# Patient Record
Sex: MALE | Race: ASIAN | NOT HISPANIC OR LATINO | Employment: FULL TIME | URBAN - METROPOLITAN AREA
[De-identification: names, ages, dates, MRNs, and addresses within clinical notes are randomized per-mention and may not be internally consistent; named-entity substitution may affect disease eponyms.]

---

## 2017-03-21 ENCOUNTER — ALLSCRIPTS OFFICE VISIT (OUTPATIENT)
Dept: OTHER | Facility: OTHER | Age: 52
End: 2017-03-21

## 2017-07-06 ENCOUNTER — ALLSCRIPTS OFFICE VISIT (OUTPATIENT)
Dept: OTHER | Facility: OTHER | Age: 52
End: 2017-07-06

## 2018-01-12 VITALS
HEART RATE: 64 BPM | TEMPERATURE: 98.2 F | DIASTOLIC BLOOD PRESSURE: 72 MMHG | OXYGEN SATURATION: 99 % | BODY MASS INDEX: 29.72 KG/M2 | SYSTOLIC BLOOD PRESSURE: 140 MMHG | WEIGHT: 196.13 LBS | HEIGHT: 68 IN | RESPIRATION RATE: 18 BRPM

## 2018-01-14 VITALS
RESPIRATION RATE: 18 BRPM | HEIGHT: 68 IN | OXYGEN SATURATION: 99 % | HEART RATE: 57 BPM | BODY MASS INDEX: 28.54 KG/M2 | WEIGHT: 188.31 LBS | SYSTOLIC BLOOD PRESSURE: 118 MMHG | DIASTOLIC BLOOD PRESSURE: 72 MMHG | TEMPERATURE: 97.1 F

## 2019-01-31 LAB
LEFT EYE DIABETIC RETINOPATHY: NORMAL
RIGHT EYE DIABETIC RETINOPATHY: NORMAL

## 2019-02-06 ENCOUNTER — OFFICE VISIT (OUTPATIENT)
Dept: FAMILY MEDICINE CLINIC | Facility: CLINIC | Age: 54
End: 2019-02-06
Payer: COMMERCIAL

## 2019-02-06 VITALS
DIASTOLIC BLOOD PRESSURE: 80 MMHG | BODY MASS INDEX: 30.87 KG/M2 | RESPIRATION RATE: 16 BRPM | SYSTOLIC BLOOD PRESSURE: 130 MMHG | WEIGHT: 203 LBS | HEART RATE: 92 BPM | OXYGEN SATURATION: 100 %

## 2019-02-06 DIAGNOSIS — Z00.00 ROUTINE ADULT HEALTH MAINTENANCE: Primary | ICD-10-CM

## 2019-02-06 DIAGNOSIS — I15.9 SECONDARY HYPERTENSION: ICD-10-CM

## 2019-02-06 DIAGNOSIS — E11.8 TYPE 2 DIABETES MELLITUS WITH COMPLICATION, WITHOUT LONG-TERM CURRENT USE OF INSULIN (HCC): ICD-10-CM

## 2019-02-06 PROCEDURE — 99396 PREV VISIT EST AGE 40-64: CPT | Performed by: FAMILY MEDICINE

## 2019-02-06 RX ORDER — LISINOPRIL 2.5 MG/1
2.5 TABLET ORAL DAILY
Qty: 90 TABLET | Refills: 3 | Status: SHIPPED | OUTPATIENT
Start: 2019-02-06

## 2019-02-06 NOTE — LETTER
February 6, 2019     Patient: Chinmay Sterling   YOB: 1965   Date of Visit: 2/6/2019       To Whom it May Concern:    Chinmay Sterling is under my professional care  He was seen in my office on 2/6/2019  He is in good health with stable condition  If you have any questions or concerns, please don't hesitate to call           Sincerely,          Americo Burnham MD

## 2019-02-07 NOTE — PROGRESS NOTES
Assessment/Plan:    No problem-specific Assessment & Plan notes found for this encounter  Diagnoses and all orders for this visit:    Type 2 diabetes mellitus with complication, without long-term current use of insulin (HCC)  -     Hemoglobin A1C; Future  -     metFORMIN (GLUCOPHAGE) 500 mg tablet; Take 1 tablet (500 mg total) by mouth daily with breakfast  -     Hemoglobin A1C; Future    Secondary hypertension  -     Lipid panel; Future  -     Comprehensive metabolic panel; Future  -     Microalbumin / creatinine urine ratio; Future  -     lisinopril (ZESTRIL) 2 5 mg tablet; Take 1 tablet (2 5 mg total) by mouth daily    Other orders  -     Discontinue: metFORMIN (GLUCOPHAGE) 500 mg tablet; Take by mouth          Prostate Cancer Screening:  Risks and Benefits discussed    Testicular Cancer Screening:  Risks and Benefits discussed    Colorectal Cancer Screening:  Risks and Benefits discussed  cologard done 2017, no result available, attempted to obtain cologuard result, noted to be positive, spoke with pt, advise pt to see GI ASAP, given a copy of report      Preventing Counseling:  Advice and education were given regarding nutrition, aerobic exercises, weight bearing exercises, cardiovascular risk reduction, fall risk reduction, and age appropriate supplements  The patient was counseled regarding instructions for management, risk factor reductions, prognosis, risks and benefits of treatment options, patient and family education, and importance of compliance with treatment  No Follow-up on file  Subjective:      Patient ID: Anabelle Tomas is a 47 y o  male      Visit Type: Health Maintenance    General Health:     Dental Regular visits: No    Vision Problems: No, recently seen pathobiologist for diabetic eye check, report reviewed    Hearing loss: No    Life Style  Healthy Diet:  Can be improve, try to be healthy  Regular Exercise: No  Weight Concerns: Yes  Tobacco Use: No  Alcohol Use: Social  Drug Use: No      Reproductive Health  Sexually Active: Yes  Contraception: No        Chief Complaint   Patient presents with    Well Check     CPE       59-year-old male comes in for complete physical, 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, patient has history diabetes mellitus type 2 for several years has been on metformin 500 mg p o  Daily, his hemoglobin A1c has been stable around 6, pt reporting he noted his sugar to be running around 120s, so he stopped metformin, recently seen the eye doctor reporting no complication with his retina, however possible glaucoma, had coloGuard done with his GI, reporting that he does not know the results since his GI never call him deny seen blood in the stool reporting his health is good, he is feeling great  The following portions of the patient's history were reviewed and updated as appropriate: allergies, current medications, past family history, past medical history, past social history, past surgical history and problem list       Review of Systems   Constitutional: Negative for activity change, appetite change, chills, diaphoresis, fatigue, fever and unexpected weight change  HENT: Negative for congestion, dental problem, drooling, ear discharge, ear pain, facial swelling, hearing loss, mouth sores, nosebleeds, postnasal drip, rhinorrhea, sinus pain and sinus pressure  Eyes: Negative for photophobia, pain, discharge, redness, itching and visual disturbance  Respiratory: Negative for apnea, cough, choking, chest tightness, shortness of breath, wheezing and stridor  Cardiovascular: Negative for chest pain, palpitations and leg swelling  Gastrointestinal: Negative for abdominal distention, abdominal pain, anal bleeding, blood in stool, constipation, diarrhea, nausea, rectal pain and vomiting  Endocrine: Negative for cold intolerance, heat intolerance, polydipsia, polyphagia and polyuria     Genitourinary: Negative for decreased urine volume, difficulty urinating, dyspareunia, dysuria, flank pain, hematuria and pelvic pain  Musculoskeletal: Negative for arthralgias, back pain, gait problem, joint swelling, myalgias, neck pain and neck stiffness  Skin: Negative for color change, pallor, rash and wound  Allergic/Immunologic: Negative for environmental allergies, food allergies and immunocompromised state  Neurological: Negative for dizziness, tremors, seizures, syncope, facial asymmetry, speech difficulty, weakness, light-headedness, numbness and headaches  Hematological: Negative for adenopathy  Does not bruise/bleed easily  Psychiatric/Behavioral: Negative for agitation, behavioral problems, confusion, decreased concentration, dysphoric mood, hallucinations, sleep disturbance and suicidal ideas  The patient is not nervous/anxious  Objective:    History   Smoking Status    Not on file   Smokeless Tobacco    Not on file       Allergies: No Known Allergies    Vitals:  /80   Pulse 92   Resp 16   Wt 92 1 kg (203 lb)   SpO2 100%   BMI 30 87 kg/m²     Current Outpatient Prescriptions   Medication Sig Dispense Refill    metFORMIN (GLUCOPHAGE) 500 mg tablet Take 1 tablet (500 mg total) by mouth daily with breakfast 90 tablet 3    lisinopril (ZESTRIL) 2 5 mg tablet Take 1 tablet (2 5 mg total) by mouth daily 90 tablet 3     No current facility-administered medications for this visit  Physical Exam   Constitutional:  oriented to person, place, and time  well-developed and well-nourished  No distress  HENT:  Normocephalic and atraumatic, Oropharynx is clear and moist  No oropharyngeal exudate  Conjunctivae and EOM are normal  Pupils are equal, round, and reactive to light  No scleral icterus  Neck: Normal range of motion  Neck supple  No JVD present  No thyromegaly present  Cardiovascular: Normal rate, regular rhythm, normal heart sounds and intact distal pulses  Exam reveals no gallop and no friction rub      No murmur heard  Pulmonary/Chest: Effort normal and breath sounds normal  No respiratory distress  no wheezes  no rales  no tenderness  Abdominal: Soft  Bowel sounds are normal  no distension and no mass  There is no tenderness  There is no rebound and no guarding  Musculoskeletal: Normal range of motion, no edema, tenderness or deformity  Lymphadenopathy:   no cervical adenopathy  Neurological: alert and oriented to person, place, and time  normal reflexes  No cranial nerve deficit  normal muscle tone  Coordination normal    Skin: Skin is warm and dry  No rash noted  not diaphoretic  No erythema  Psychiatric:normal mood and affect  behavior is normal  Judgment and thought content normal    Nursing note and vitals reviewed  Results Reviewed     None        PHQ9 REVIEWED  Immunization status: up to date and documented, unknown status, parent to bring shot records

## 2019-02-12 LAB
ALBUMIN SERPL-MCNC: 4.6 G/DL (ref 3.5–5.5)
ALBUMIN/CREAT UR: <2.5 MG/G CREAT (ref 0–30)
ALBUMIN/GLOB SERPL: 1.8 {RATIO} (ref 1.2–2.2)
ALP SERPL-CCNC: 48 IU/L (ref 39–117)
ALT SERPL-CCNC: 23 IU/L (ref 0–44)
AST SERPL-CCNC: 23 IU/L (ref 0–40)
BILIRUB SERPL-MCNC: 0.4 MG/DL (ref 0–1.2)
BUN SERPL-MCNC: 18 MG/DL (ref 6–24)
BUN/CREAT SERPL: 18 (ref 9–20)
CALCIUM SERPL-MCNC: 9.3 MG/DL (ref 8.7–10.2)
CHLORIDE SERPL-SCNC: 100 MMOL/L (ref 96–106)
CHOLEST SERPL-MCNC: 192 MG/DL (ref 100–199)
CO2 SERPL-SCNC: 23 MMOL/L (ref 20–29)
CREAT SERPL-MCNC: 1.02 MG/DL (ref 0.76–1.27)
CREAT UR-MCNC: 121.4 MG/DL
GLOBULIN SER-MCNC: 2.5 G/DL (ref 1.5–4.5)
GLUCOSE SERPL-MCNC: 111 MG/DL (ref 65–99)
HBA1C MFR BLD: 6.3 % (ref 4.8–5.6)
HDLC SERPL-MCNC: 36 MG/DL
LABCORP COMMENT: NORMAL
LDLC SERPL CALC-MCNC: 116 MG/DL (ref 0–99)
MICROALBUMIN UR-MCNC: <3 UG/ML
POTASSIUM SERPL-SCNC: 4.5 MMOL/L (ref 3.5–5.2)
PROT SERPL-MCNC: 7.1 G/DL (ref 6–8.5)
SL AMB EGFR AFRICAN AMERICAN: 97 ML/MIN/1.73
SL AMB EGFR NON AFRICAN AMERICAN: 84 ML/MIN/1.73
SL AMB VLDL CHOLESTEROL CALC: 40 MG/DL (ref 5–40)
SODIUM SERPL-SCNC: 136 MMOL/L (ref 134–144)
TRIGL SERPL-MCNC: 201 MG/DL (ref 0–149)

## 2019-02-13 ENCOUNTER — TELEPHONE (OUTPATIENT)
Dept: GASTROENTEROLOGY | Facility: CLINIC | Age: 54
End: 2019-02-13

## 2019-02-13 DIAGNOSIS — Z12.11 SCREEN FOR COLON CANCER: Primary | ICD-10-CM

## 2019-02-13 PROBLEM — R19.5 POSITIVE COLORECTAL CANCER SCREENING USING COLOGUARD TEST: Status: ACTIVE | Noted: 2019-02-13

## 2019-02-13 NOTE — TELEPHONE ENCOUNTER
----- Message from Suraj Cochran sent at 2/13/2019  9:12 AM EST -----  Ryland Fam,     As we discussed, Please schedule patient for colonoscopy  2/22 (Dr Arnold Meneses next [facility] scope day)  Dr Smith Manner I will look into why we did not receive his abnormal stool dna test        ----- Message -----  From: Mauro Marcano MD  Sent: 2/12/2019   1:30 PM  To: Mauro Marcano MD, Suraj Cochran    Patient called office, has abnormal stool dna test      We never received the results  Patient called for bill, but I see no documentation in epic or in allscripts  He needs a colonoscopy ASAP    You can scheduel him for procedure if he wants to go stright to colon we can do that as  well

## 2019-02-13 NOTE — TELEPHONE ENCOUNTER
Pt has been scheduled with Kelly 2/22/2019 at [facility]  Pt has been notified, instructions have been sent  Requested with Simba Gamble for 730 am start for Dr Edgar Santana on 2/22/2019  Please send Suprep to pharmacy on file

## 2019-02-20 NOTE — TELEPHONE ENCOUNTER
I also called and left a voicemail for the patient  I told him that this is a diagnostic test because of the abnormal stool test   He must have this test whether discovered completely by his insurance or not because it could be life-threatening

## 2019-02-21 ENCOUNTER — ANESTHESIA EVENT (OUTPATIENT)
Dept: GASTROENTEROLOGY | Facility: AMBULARY SURGERY CENTER | Age: 54
End: 2019-02-21
Payer: COMMERCIAL

## 2019-02-21 RX ORDER — IBUPROFEN 200 MG
TABLET ORAL EVERY 6 HOURS PRN
COMMUNITY

## 2019-02-21 RX ORDER — ACETAMINOPHEN 500 MG
500 TABLET ORAL EVERY 6 HOURS PRN
COMMUNITY

## 2019-02-21 NOTE — PRE-PROCEDURE INSTRUCTIONS
Pre-Surgery Instructions:   Medication Instructions    acetaminophen (TYLENOL) 500 mg tablet Patient was instructed by Physician and understands   BIOTIN PO Patient was instructed by Physician and understands   Glucosamine-Chondroitin (MOVE FREE PO) Patient was instructed by Physician and understands   ibuprofen (MOTRIN) 200 mg tablet Patient was instructed by Physician and understands   Na Sulfate-K Sulfate-Mg Sulf (SUPREP BOWEL PREP KIT) 17 5-3 13-1 6 GM/177ML SOLN Patient was instructed by Physician and understands   Probiotic Product (PROBIOTIC PO) Patient was instructed by Physician and understands  Pt to follow Dr Rosales Lunch instructions    wife Donny

## 2019-02-21 NOTE — ANESTHESIA PREPROCEDURE EVALUATION
Review of Systems/Medical History  Patient summary reviewed  Chart reviewed  No history of anesthetic complications     Cardiovascular  No hypertension (h/o HTN) ,    Pulmonary  Smoker ex-smoker  ,        GI/Hepatic            Endo/Other  Diabetes (insulin resistance history) type 2 ,   Obesity    GYN       Hematology   Musculoskeletal       Neurology   Psychology           Physical Exam    Airway    Mallampati score: II  TM Distance: >3 FB  Neck ROM: full     Dental       Cardiovascular  Rhythm: regular, Rate: normal,     Pulmonary  Breath sounds clear to auscultation,     Other Findings        Anesthesia Plan  ASA Score- 2     Anesthesia Type- IV sedation with anesthesia with ASA Monitors  Additional Monitors:   Airway Plan:         Plan Factors-    Induction- intravenous  Postoperative Plan-     Informed Consent- Anesthetic plan and risks discussed with patient  I personally reviewed this patient with the CRNA  Discussed and agreed on the Anesthesia Plan with the CRNA  Joni De La Torre

## 2019-02-22 ENCOUNTER — ANESTHESIA (OUTPATIENT)
Dept: GASTROENTEROLOGY | Facility: AMBULARY SURGERY CENTER | Age: 54
End: 2019-02-22
Payer: COMMERCIAL

## 2019-02-22 ENCOUNTER — HOSPITAL ENCOUNTER (OUTPATIENT)
Facility: AMBULARY SURGERY CENTER | Age: 54
Setting detail: OUTPATIENT SURGERY
Discharge: HOME | End: 2019-02-22
Attending: INTERNAL MEDICINE | Admitting: INTERNAL MEDICINE
Payer: COMMERCIAL

## 2019-02-22 VITALS
SYSTOLIC BLOOD PRESSURE: 109 MMHG | BODY MASS INDEX: 29.7 KG/M2 | RESPIRATION RATE: 18 BRPM | WEIGHT: 196 LBS | HEART RATE: 71 BPM | DIASTOLIC BLOOD PRESSURE: 72 MMHG | HEIGHT: 68 IN | OXYGEN SATURATION: 96 % | TEMPERATURE: 97.2 F

## 2019-02-22 DIAGNOSIS — R19.5 POSITIVE COLORECTAL CANCER SCREENING USING COLOGUARD TEST: ICD-10-CM

## 2019-02-22 PROCEDURE — 88305 TISSUE EXAM BY PATHOLOGIST: CPT | Performed by: PATHOLOGY

## 2019-02-22 PROCEDURE — 45385 COLONOSCOPY W/LESION REMOVAL: CPT | Performed by: INTERNAL MEDICINE

## 2019-02-22 RX ORDER — PROPOFOL 10 MG/ML
INJECTION, EMULSION INTRAVENOUS AS NEEDED
Status: DISCONTINUED | OUTPATIENT
Start: 2019-02-22 | End: 2019-02-22 | Stop reason: SURG

## 2019-02-22 RX ORDER — SODIUM CHLORIDE 9 MG/ML
INJECTION, SOLUTION INTRAVENOUS CONTINUOUS PRN
Status: DISCONTINUED | OUTPATIENT
Start: 2019-02-22 | End: 2019-02-22 | Stop reason: SURG

## 2019-02-22 RX ORDER — LIDOCAINE HYDROCHLORIDE 10 MG/ML
INJECTION, SOLUTION INFILTRATION; PERINEURAL AS NEEDED
Status: DISCONTINUED | OUTPATIENT
Start: 2019-02-22 | End: 2019-02-22 | Stop reason: SURG

## 2019-02-22 RX ORDER — SODIUM CHLORIDE 9 MG/ML
75 INJECTION, SOLUTION INTRAVENOUS CONTINUOUS
Status: DISCONTINUED | OUTPATIENT
Start: 2019-02-22 | End: 2019-02-22 | Stop reason: HOSPADM

## 2019-02-22 RX ADMIN — PROPOFOL 100 MG: 10 INJECTION, EMULSION INTRAVENOUS at 08:06

## 2019-02-22 RX ADMIN — PROPOFOL 60 MG: 10 INJECTION, EMULSION INTRAVENOUS at 08:21

## 2019-02-22 RX ADMIN — PROPOFOL 40 MG: 10 INJECTION, EMULSION INTRAVENOUS at 08:13

## 2019-02-22 RX ADMIN — SODIUM CHLORIDE: 0.9 INJECTION, SOLUTION INTRAVENOUS at 08:04

## 2019-02-22 RX ADMIN — PROPOFOL 50 MG: 10 INJECTION, EMULSION INTRAVENOUS at 08:08

## 2019-02-22 RX ADMIN — LIDOCAINE HYDROCHLORIDE 50 MG: 10 INJECTION, SOLUTION INFILTRATION; PERINEURAL at 08:06

## 2019-02-22 RX ADMIN — SODIUM CHLORIDE 75 ML/HR: 0.9 INJECTION, SOLUTION INTRAVENOUS at 07:42

## 2019-02-22 RX ADMIN — PROPOFOL 50 MG: 10 INJECTION, EMULSION INTRAVENOUS at 08:11

## 2019-02-22 RX ADMIN — PROPOFOL 40 MG: 10 INJECTION, EMULSION INTRAVENOUS at 08:18

## 2019-02-22 RX ADMIN — PROPOFOL 60 MG: 10 INJECTION, EMULSION INTRAVENOUS at 08:15

## 2019-02-22 NOTE — DISCHARGE INSTRUCTIONS
Resume regular diet  Resume home medications  Follow up biopsy results  Repeat colonoscopy in 5 years, or based on pathology  Call with any abdominal pain, bleeding, fevers

## 2019-02-22 NOTE — OP NOTE
Colonoscopy Procedure Note    Procedure: Colonoscopy    Sedation: Monitored anesthesia care, check anesthesia records      ASA Class: 2    INDICATIONS:  Abnormal stool test    POST-OP DIAGNOSIS: See the impression below    Procedure Details     Prior colonoscopy: No prior colonoscopy  Informed consent was obtained for the procedure, including sedation  Risks of perforation, hemorrhage, adverse drug reaction and aspiration were discussed  The patient was placed in the left lateral decubitus position  Based on the pre-procedure assessment, including review of the patient's medical history, medications, allergies, and review of systems, he had been deemed to be an appropriate candidate for conscious sedation; he was therefore sedated with the medications listed below  The patient was monitored continuously with telemetry, pulse oximetry, blood pressure monitoring, and direct observations  A rectal examination was performed  The colonoscope was inserted into the rectum and advanced under direct vision to the cecum, which was identified by the ileocecal valve and appendiceal orifice  The quality of the colonic preparation was good  A careful inspection was made as the colonoscope was withdrawn, including a retroflexed view of the rectum; findings and interventions are described below  Findings:    4 millimeter transverse colon polyp removed by cold snare  4 millimeter rectal polyp just above the anal verge removed by cold snare  Mild internal hemorrhoids on retroflexion  Otherwise normal colonoscopy with good prep good visualization, no concerning findings on examination           Complications: None; patient tolerated the procedure well      Impression:    Transverse and rectal polyp removed by cold snare  Hemorrhoids  Otherwise normal colonoscopy with no concerning findings    Recommendations:    Resume regular diet  Resume home medications  Follow up biopsy results  Repeat colonoscopy in 5 years, or based on pathology  Call with any abdominal pain, bleeding, fevers    COMPLICATIONS:  None; patient tolerated the procedure well      SPECIMENS:    ID Type Source Tests Collected by Time Destination   1 : 1  cold snare transverse colon polyp Tissue Polyp, Colorectal TISSUE EXAM Madeleine White MD 2/22/2019 0818    2 : 2  rectal polyp hot snare Tissue Polyp, Colorectal TISSUE EXAM Madeleine White MD 2/22/2019 9445        ESTIMATED BLOOD LOSS:  Minimal

## 2019-02-22 NOTE — ANESTHESIA POSTPROCEDURE EVALUATION
Post-Op Assessment Note    CV Status:  Stable  Pain Score: 0    Pain management: adequate     Mental Status:  Awake   Hydration Status:  Stable   PONV Controlled:  None   Airway Patency:  Patent   Post Op Vitals Reviewed: Yes      Staff: Anesthesiologist           BP      Temp     Pulse     Resp      SpO2

## 2019-02-22 NOTE — DISCHARGE INSTR - AVS FIRST PAGE
Colonoscopy Procedure Note    Procedure: Colonoscopy    Sedation: Monitored anesthesia care, check anesthesia records      ASA Class: 2    INDICATIONS:  Abnormal stool test    POST-OP DIAGNOSIS: See the impression below    Procedure Details     Prior colonoscopy: No prior colonoscopy  Informed consent was obtained for the procedure, including sedation  Risks of perforation, hemorrhage, adverse drug reaction and aspiration were discussed  The patient was placed in the left lateral decubitus position  Based on the pre-procedure assessment, including review of the patient's medical history, medications, allergies, and review of systems, he had been deemed to be an appropriate candidate for conscious sedation; he was therefore sedated with the medications listed below  The patient was monitored continuously with telemetry, pulse oximetry, blood pressure monitoring, and direct observations  A rectal examination was performed  The colonoscope was inserted into the rectum and advanced under direct vision to the cecum, which was identified by the ileocecal valve and appendiceal orifice  The quality of the colonic preparation was good  A careful inspection was made as the colonoscope was withdrawn, including a retroflexed view of the rectum; findings and interventions are described below  Findings:    4 millimeter transverse colon polyp removed by cold snare  4 millimeter rectal polyp just above the anal verge removed by cold snare  Mild internal hemorrhoids on retroflexion  Otherwise normal colonoscopy with good prep good visualization, no concerning findings on examination           Complications: None; patient tolerated the procedure well      Impression:    Transverse and rectal polyp removed by cold snare  Hemorrhoids  Otherwise normal colonoscopy with no concerning findings    Recommendations:    Resume regular diet  Resume home medications  Follow up biopsy results  Repeat colonoscopy in 5 years, or based on pathology  Call with any abdominal pain, bleeding, fevers    COMPLICATIONS:  None; patient tolerated the procedure well      SPECIMENS:    ID Type Source Tests Collected by Time Destination   1 : 1  cold snare transverse colon polyp Tissue Polyp, Colorectal TISSUE EXAM Reese Villatoro MD 2/22/2019 0818    2 : 2  rectal polyp hot snare Tissue Polyp, Colorectal TISSUE EXAM Reese Villatoro MD 2/22/2019 1260        ESTIMATED BLOOD LOSS:  Minimal

## 2019-02-22 NOTE — H&P
History and Physical -  [facility]  Daniel Sheth 47 y o  male MRN: [id number]    HPI: Daniel Sheth is a 47y o  year old male who presents with abnormal stool test        Review of Systems    Historical Information   Past Medical History:   Diagnosis Date    History of hypertension     Hyperlipidemia     Pre-diabetes     [id number] 3    Snores     Wears glasses      Past Surgical History:   Procedure Laterality Date    NO PAST SURGERIES       Social History   Social History     Substance and Sexual Activity   Alcohol Use Not Currently     Social History     Substance and Sexual Activity   Drug Use Never     Social History     Tobacco Use   Smoking Status Former Smoker    Last attempt to quit:     Years since quittin 1   Smokeless Tobacco Never Used     Family History   Problem Relation Age of Onset    Diabetes Mother        Meds/Allergies     Medications Prior to Admission   Medication    lisinopril (ZESTRIL) 2 5 mg tablet    metFORMIN (GLUCOPHAGE) 500 mg tablet    Na Sulfate-K Sulfate-Mg Sulf (SUPREP BOWEL PREP KIT) 17 5-3 13-1 6 GM/177ML SOLN    acetaminophen (TYLENOL) 500 mg tablet    BIOTIN PO    Glucosamine-Chondroitin (MOVE FREE PO)    ibuprofen (MOTRIN) 200 mg tablet    Probiotic Product (PROBIOTIC PO)       No Known Allergies    Objective     Blood pressure 131/80, pulse 66, temperature (!) 97 2 °F (36 2 °C), temperature source Tympanic, resp  rate 14, height 5' 8" (1 727 m), weight 88 9 kg (196 lb), SpO2 97 %        PHYSICAL EXAM    Gen: NAD  CV: RRR  CHEST: Clear  ABD: soft, NT/ND  EXT: no edema  Neuro: AAO      ASSESSMENT/PLAN:  This is a 47y o  year old male here for  abnormal stool test      PLAN:   Procedure: colonoscoyp

## 2019-03-01 ENCOUNTER — TELEPHONE (OUTPATIENT)
Dept: GASTROENTEROLOGY | Facility: CLINIC | Age: 54
End: 2019-03-01

## 2019-03-01 NOTE — TELEPHONE ENCOUNTER
----- Message from Ai Dent MD sent at 2/28/2019  8:40 AM EST -----  Please inform the patient that 1 polyp removed was a tubular adenoma, the other polyp was a hyperplastic polyp  There was no high-grade dysplasia and no cancer  I recommend repeat colonoscopy in 5 years, please put in for recall  Please have the patient call with any questions or concerns

## 2019-04-17 ENCOUNTER — TELEPHONE (OUTPATIENT)
Dept: FAMILY MEDICINE CLINIC | Facility: CLINIC | Age: 54
End: 2019-04-17

## 2019-04-17 ENCOUNTER — TELEPHONE (OUTPATIENT)
Dept: GASTROENTEROLOGY | Facility: AMBULARY SURGERY CENTER | Age: 54
End: 2019-04-17

## 2021-04-08 DIAGNOSIS — Z23 ENCOUNTER FOR IMMUNIZATION: ICD-10-CM

## 2024-02-16 ENCOUNTER — TELEPHONE (OUTPATIENT)
Dept: GASTROENTEROLOGY | Facility: CLINIC | Age: 59
End: 2024-02-16

## 2024-02-16 NOTE — TELEPHONE ENCOUNTER
Patient is due for a colon recall beginning of March with Dr. Mendoza for hx of polyps, tubular adenoma. Left message for patient to call and schedule.

## 2024-03-20 ENCOUNTER — APPOINTMENT (OUTPATIENT)
Age: 59
End: 2024-03-20

## 2024-03-20 DIAGNOSIS — Z00.8 ENCOUNTER FOR OTHER GENERAL EXAMINATION: ICD-10-CM

## 2024-03-20 LAB
ABO GROUP BLD: NORMAL
RH BLD: POSITIVE

## 2024-03-20 PROCEDURE — 86900 BLOOD TYPING SEROLOGIC ABO: CPT | Performed by: EMERGENCY MEDICINE

## 2024-03-20 PROCEDURE — 81003 URINALYSIS AUTO W/O SCOPE: CPT | Performed by: EMERGENCY MEDICINE

## 2024-03-20 PROCEDURE — 83036 HEMOGLOBIN GLYCOSYLATED A1C: CPT | Performed by: EMERGENCY MEDICINE

## 2024-03-20 PROCEDURE — 86901 BLOOD TYPING SEROLOGIC RH(D): CPT | Performed by: EMERGENCY MEDICINE

## 2024-03-20 PROCEDURE — 82947 ASSAY GLUCOSE BLOOD QUANT: CPT | Performed by: EMERGENCY MEDICINE

## 2024-04-03 ENCOUNTER — TELEPHONE (OUTPATIENT)
Age: 59
End: 2024-04-03

## 2024-04-03 NOTE — TELEPHONE ENCOUNTER
----- Message from Mesfin Bansal MD sent at 3/26/2024  9:41 PM EDT -----  Regarding: Please schedule to reestablish care  Please offer patient appointment to reestablish PCP care to address his diabetes.  He hasn't been seen since 2019.  If he already has a PCP or decides to follow up elsewhere, please update chart accordingly and remove us, and advise him to follow up with another provider for his diabetes.    Thanks,  Alvin     ----- Message -----  From: Ramya Norris DO  Sent: 3/26/2024   8:09 AM EDT  To: Mesfin Bansal MD    Good Morning Dr. Lopez,    I completed an occupational physical for Mr Calixto.  His HbA1c was slightly out of range.  He did disclose to me that he took himself off his metformin about 2 years ago.      Thank you,  Ramya Norris

## 2024-07-01 ENCOUNTER — TELEPHONE (OUTPATIENT)
Age: 59
End: 2024-07-01

## 2024-07-01 NOTE — TELEPHONE ENCOUNTER
Attempt #1 - Attempted to contact patient, no answer. Left VM to see if he would like to re-establish care with us.

## (undated) DEVICE — TUBING BUBBLE CLEAR 5MM X 100 FT NS

## (undated) DEVICE — GAUZE SPONGES,16 PLY: Brand: CURITY

## (undated) DEVICE — AIRLIFE™  ADULT CUSHION NASAL CANNULA WITH 7 FOOT (2.1 M) CRUSH-RESISTANT OXYGEN TUBING, AND U/CONNECT-IT ADAPTER: Brand: AIRLIFE™

## (undated) DEVICE — SINGLE-USE BIOPSY FORCEPS: Brand: RADIAL JAW 4

## (undated) DEVICE — DISPOSABLE BIOPSY VALVE MAJ-1555: Brand: SINGLE USE BIOPSY VALVE (STERILE)

## (undated) DEVICE — "MH-443 SUCTION VALVE F/EVIS140 EVIS160": Brand: SUCTION VALVE

## (undated) DEVICE — TUBING AUX CHANNEL

## (undated) DEVICE — SOLIDIFIER FLUID WASTE CONTROL 1500ML

## (undated) DEVICE — "MAJ-901 WATER CONTAINER SET CV-160/140": Brand: WATER CONTAINER

## (undated) DEVICE — LUBRICANT SURGILUBE TUBE 4 OZ  FLIP TOP

## (undated) DEVICE — MEDI-VAC YANKAUER SUCTION HANDLE: Brand: CARDINAL HEALTH

## (undated) DEVICE — GLOVE EXAM NON-STRL NTRL PLUS LRG PF

## (undated) DEVICE — BRUSH ENDO CLEANING DBL-HEADER

## (undated) DEVICE — "MH-438 A/W VLVE F/140 EVIS-140": Brand: AIR/WATER VALVE

## (undated) DEVICE — 1200CC GUARDIAN II: Brand: GUARDIAN